# Patient Record
Sex: FEMALE | Race: WHITE | Employment: FULL TIME | URBAN - METROPOLITAN AREA
[De-identification: names, ages, dates, MRNs, and addresses within clinical notes are randomized per-mention and may not be internally consistent; named-entity substitution may affect disease eponyms.]

---

## 2018-11-14 ENCOUNTER — HOSPITAL ENCOUNTER (EMERGENCY)
Facility: CLINIC | Age: 48
Discharge: HOME | End: 2018-11-14
Attending: EMERGENCY MEDICINE | Admitting: EMERGENCY MEDICINE
Payer: COMMERCIAL

## 2018-11-14 VITALS
SYSTOLIC BLOOD PRESSURE: 133 MMHG | RESPIRATION RATE: 18 BRPM | TEMPERATURE: 97 F | WEIGHT: 172 LBS | OXYGEN SATURATION: 97 % | HEART RATE: 86 BPM | DIASTOLIC BLOOD PRESSURE: 95 MMHG

## 2018-11-14 DIAGNOSIS — G43.001 MIGRAINE WITHOUT AURA AND WITH STATUS MIGRAINOSUS, NOT INTRACTABLE: ICD-10-CM

## 2018-11-14 PROCEDURE — 96375 TX/PRO/DX INJ NEW DRUG ADDON: CPT

## 2018-11-14 PROCEDURE — 25000128 H RX IP 250 OP 636: Performed by: EMERGENCY MEDICINE

## 2018-11-14 PROCEDURE — 99284 EMERGENCY DEPT VISIT MOD MDM: CPT | Mod: 25

## 2018-11-14 PROCEDURE — 96374 THER/PROPH/DIAG INJ IV PUSH: CPT

## 2018-11-14 RX ORDER — DEXAMETHASONE SODIUM PHOSPHATE 10 MG/ML
10 INJECTION, SOLUTION INTRAMUSCULAR; INTRAVENOUS ONCE
Status: COMPLETED | OUTPATIENT
Start: 2018-11-14 | End: 2018-11-14

## 2018-11-14 RX ORDER — DIPHENHYDRAMINE HYDROCHLORIDE 50 MG/ML
25 INJECTION INTRAMUSCULAR; INTRAVENOUS ONCE
Status: COMPLETED | OUTPATIENT
Start: 2018-11-14 | End: 2018-11-14

## 2018-11-14 RX ADMIN — DIPHENHYDRAMINE HYDROCHLORIDE 25 MG: 50 INJECTION, SOLUTION INTRAMUSCULAR; INTRAVENOUS at 12:40

## 2018-11-14 RX ADMIN — PROCHLORPERAZINE EDISYLATE 10 MG: 5 INJECTION INTRAMUSCULAR; INTRAVENOUS at 12:40

## 2018-11-14 RX ADMIN — DEXAMETHASONE SODIUM PHOSPHATE 10 MG: 10 INJECTION, SOLUTION INTRAMUSCULAR; INTRAVENOUS at 12:40

## 2018-11-14 ASSESSMENT — ENCOUNTER SYMPTOMS
PHOTOPHOBIA: 0
NECK PAIN: 0
HEADACHES: 1
NECK STIFFNESS: 0
BACK PAIN: 0

## 2018-11-14 NOTE — ED TRIAGE NOTES
Headache that started on Friday. Has been taking ibuprofen with no relief.  No difficulty with vision or weakness.

## 2018-11-14 NOTE — ED AVS SNAPSHOT
[facility] Emergency Department    [address]    [facility] [address]    Phone:  [phone]    Fax:  [phone]                                       Shruthi Hart   MRN: [id number]    Department:  [facility] Emergency Department   Date of Visit:  11/14/2018           Patient Information     Date Of Birth          1970        Your diagnoses for this visit were:     Migraine without aura and with status migrainosus, not intractable        You were seen by Ector Gamble MD.      Follow-up Information     Follow up with Clinic, [facility]. Schedule an appointment as soon as possible for a visit in 1 day.    Why:  As needed, If symptoms worsen    Contact information:    [address] [facility] [id number] [phone]          Discharge Instructions         Migraine Headache  This often severe type of headache is different from other types of headaches in that symptoms other than pain occur with the headache. Nausea and vomiting, lightheadedness, sensitivity to light (photophobia), and other visual disturbances are common migraine symptoms. The pain may last from a few hours to several days. It is not clear why migraines occur but certain factors called  triggers  can raise the risk of having a migraine attack. A migraine may be triggered by emotional stress or depression, or by hormone changes during the menstrual cycle. Other triggers include birth control pills, overuse of migraine medicines, alcohol or caffeine, foods with tyramine (such as aged cheese and wine), eyestrain, weather changes, missed meals, or too little or too much sleep.  Home care  Follow these tips when taking care of yourself at home:    Don t drive yourself home if you were given pain medicine for your headache or are having visual symptoms. Instead, have someone else drive you home. Try to sleep when you get home. You should feel much better when you wake up.    Cold can  help ease migraine symptoms. Put an ice pack on your forehead or at the base of your skull. Put heat on the back of your neck to help ease any neck spasm.    Drink only clear liquids or eat a light diet until your symptoms get better. This will help you avoid nausea and vomiting.  How to prevent migraines  Pay attention to what seems to trigger your headache. Try to avoid the triggers when you can. If you have frequent headaches, consider keeping a headache diary. In it, write down what you were doing, feeling, or eating in the hours before each headache. Show this to your healthcare provider to help find the cause of your headaches.  If stress seems to be a trigger for your headaches, figure out what is causing stress in your life. Learn new ways to handle your stress. Ideas include regular exercise, biofeedback, self-hypnosis, yoga, and meditation. Talk with your healthcare provider to find out more information about managing stress. Many books and digital media are also available on this subject.  Tyramine is a substance found in many foods. It can trigger a migraine in some people. These foods contain tyramine:    Chocolate    Yogurt    All cheeses, but especially aged cheeses    Smoked or pickled fish and meat, including herring, caviar, bologna, pepperoni, and salami    Liver    Avocados    Bananas    Figs    Raisins    Red wine  Try staying away from these foods for 1 to 2 months to see if you have fewer headaches.  How to treat future headaches    Take time out at the first sign of a headache, if possible. Find a quiet, dark, comfortable place to sit or lie down. Let yourself relax or sleep.    Put an ice pack on your forehead or on the area of greatest pain. A heating pad and massage may help if you are having a muscle spasm and tightness in your neck.    If you have been prescribed a medicine to stop a migraine headache, use this at the first warning sign of the headache for best results. First signs may  be an aura or pain.    If you need to take medicine often for your migraine, talk with your healthcare provider about other ways to prevent your headaches.  Follow-up care  Follow up with your healthcare provider, or as advised. Talk with your provider if you have frequent headaches. He or she can figure out a treatment plan. Ask if you can have medicine to take at home the next time you get a bad headache. This may keep you from having to visit the emergency department in the future. You may need to see a headache specialist (neurologist) if you continue to have headaches.  When to seek medical advice  Call your healthcare provider right away if any of these occur:    Your head pain gets worse, or doesn t get better within 24 hours    You can t keep liquids down (repeated vomiting)    Pain in your sinuses, ears, or throat    Fever of 100.4  F (38  C) or higher, or as directed by your healthcare provider    Stiff neck    Extreme drowsiness, confusion, or fainting    Dizziness, or dizziness with spinning sensation (vertigo)    Weakness in an arm or leg, or on one side of your face    Difficulty talking or seeing  Date Last Reviewed: 8/1/2016 2000-2018 [employer]. [address] [place] [address]. All rights reserved. This information is not intended as a substitute for professional medical care. Always follow your healthcare professional's instructions.          24 Hour Appointment Hotline       To make an appointment at any [facility], call [phone] ([phone]). If you don't have a family doctor or clinic, we will help you find one. [facility] clinics are conveniently located to serve the needs of you and your family.             Review of your medicines      Notice     You have not been prescribed any medications.            Orders Needing Specimen Collection     None      Pending Results     No orders found from 11/12/2018 to 11/15/2018.            Pending Culture Results      No orders found from 11/12/2018 to 11/15/2018.            Pending Results Instructions     If you had any lab results that were not finalized at the time of your Discharge, you can call the ED Lab Result RN at [phone]. You will be contacted by this team for any positive Lab results or changes in treatment. The nurses are available 7 days a week from 10A to 6:30P.  You can leave a message 24 hours per day and they will return your call.        Test Results From Your Hospital Stay               Clinical Quality Measure: Blood Pressure Screening     Your blood pressure was checked while you were in the emergency department today. The last reading we obtained was  BP: 132/86 (Simultaneous filing. User may not have seen previous data.) . Please read the guidelines below about what these numbers mean and what you should do about them.  If your systolic blood pressure (the top number) is less than 120 and your diastolic blood pressure (the bottom number) is less than 80, then your blood pressure is normal. There is nothing more that you need to do about it.  If your systolic blood pressure (the top number) is 120-139 or your diastolic blood pressure (the bottom number) is 80-89, your blood pressure may be higher than it should be. You should have your blood pressure rechecked within a year by a primary care provider.  If your systolic blood pressure (the top number) is 140 or greater or your diastolic blood pressure (the bottom number) is 90 or greater, you may have high blood pressure. High blood pressure is treatable, but if left untreated over time it can put you at risk for heart attack, stroke, or kidney failure. You should have your blood pressure rechecked by a primary care provider within the next 4 weeks.  If your provider in the emergency department today gave you specific instructions to follow-up with your doctor or provider even sooner than that, you should follow that instruction and not wait for up  "to 4 weeks for your follow-up visit.        Thank you for choosing [facility]       Thank you for choosing [facility] for your care. Our goal is always to provide you with excellent care. Hearing back from our patients is one way we can continue to improve our services. Please take a few minutes to complete the written survey that you may receive in the mail after you visit with us. Thank you!        [employer] Information     [employer] lets you send messages to your doctor, view your test results, renew your prescriptions, schedule appointments and more. To sign up, go to [email or web address] . Click on \"Log in\" on the left side of the screen, which will take you to the Welcome page. Then click on \"Sign up Now\" on the right side of the page.     You will be asked to enter the access code listed below, as well as some personal information. Please follow the directions to create your username and password.     Your access code is: [email or web address]  Expires: 2019  1:56 PM     Your access code will  in 90 days. If you need help or a new code, please call your [facility] clinic or [phone].        Care EveryWhere ID     This is your Care EveryWhere ID. This could be used by other organizations to access your [facility] medical records  [id number]        Equal Access to Services     LISSY DOCKERY AH: Hetal smith Sodenise, waaxda luqadaha, qaybta kaalmada nicolasa, gissell zheng. So [facility] [phone].    ATENCIÓN: Si habla español, tiene a valderrama disposición servicios gratuitos de asistencia lingüística. Llame al [phone].    We comply with applicable federal civil rights laws and [place] laws. We do not discriminate on the basis of race, color, national origin, age, disability, sex, sexual orientation, or gender identity.            After Visit Summary       This is your record. Keep this with you and show to your community pharmacist(s) and doctor(s) at your next visit.  "

## 2018-11-14 NOTE — DISCHARGE INSTRUCTIONS
Migraine Headache  This often severe type of headache is different from other types of headaches in that symptoms other than pain occur with the headache. Nausea and vomiting, lightheadedness, sensitivity to light (photophobia), and other visual disturbances are common migraine symptoms. The pain may last from a few hours to several days. It is not clear why migraines occur but certain factors called  triggers  can raise the risk of having a migraine attack. A migraine may be triggered by emotional stress or depression, or by hormone changes during the menstrual cycle. Other triggers include birth control pills, overuse of migraine medicines, alcohol or caffeine, foods with tyramine (such as aged cheese and wine), eyestrain, weather changes, missed meals, or too little or too much sleep.  Home care  Follow these tips when taking care of yourself at home:    Don t drive yourself home if you were given pain medicine for your headache or are having visual symptoms. Instead, have someone else drive you home. Try to sleep when you get home. You should feel much better when you wake up.    Cold can help ease migraine symptoms. Put an ice pack on your forehead or at the base of your skull. Put heat on the back of your neck to help ease any neck spasm.    Drink only clear liquids or eat a light diet until your symptoms get better. This will help you avoid nausea and vomiting.  How to prevent migraines  Pay attention to what seems to trigger your headache. Try to avoid the triggers when you can. If you have frequent headaches, consider keeping a headache diary. In it, write down what you were doing, feeling, or eating in the hours before each headache. Show this to your healthcare provider to help find the cause of your headaches.  If stress seems to be a trigger for your headaches, figure out what is causing stress in your life. Learn new ways to handle your stress. Ideas include regular exercise, biofeedback,  self-hypnosis, yoga, and meditation. Talk with your healthcare provider to find out more information about managing stress. Many books and digital media are also available on this subject.  Tyramine is a substance found in many foods. It can trigger a migraine in some people. These foods contain tyramine:    Chocolate    Yogurt    All cheeses, but especially aged cheeses    Smoked or pickled fish and meat, including herring, caviar, bologna, pepperoni, and salami    Liver    Avocados    Bananas    Figs    Raisins    Red wine  Try staying away from these foods for 1 to 2 months to see if you have fewer headaches.  How to treat future headaches    Take time out at the first sign of a headache, if possible. Find a quiet, dark, comfortable place to sit or lie down. Let yourself relax or sleep.    Put an ice pack on your forehead or on the area of greatest pain. A heating pad and massage may help if you are having a muscle spasm and tightness in your neck.    If you have been prescribed a medicine to stop a migraine headache, use this at the first warning sign of the headache for best results. First signs may be an aura or pain.    If you need to take medicine often for your migraine, talk with your healthcare provider about other ways to prevent your headaches.  Follow-up care  Follow up with your healthcare provider, or as advised. Talk with your provider if you have frequent headaches. He or she can figure out a treatment plan. Ask if you can have medicine to take at home the next time you get a bad headache. This may keep you from having to visit the emergency department in the future. You may need to see a headache specialist (neurologist) if you continue to have headaches.  When to seek medical advice  Call your healthcare provider right away if any of these occur:    Your head pain gets worse, or doesn t get better within 24 hours    You can t keep liquids down (repeated vomiting)    Pain in your sinuses, ears, or  throat    Fever of 100.4  F (38  C) or higher, or as directed by your healthcare provider    Stiff neck    Extreme drowsiness, confusion, or fainting    Dizziness, or dizziness with spinning sensation (vertigo)    Weakness in an arm or leg, or on one side of your face    Difficulty talking or seeing  Date Last Reviewed: 8/1/2016 2000-2018 [employer]. [address] [place] [address]. All rights reserved. This information is not intended as a substitute for professional medical care. Always follow your healthcare professional's instructions.

## 2018-11-14 NOTE — ED PROVIDER NOTES
History     Chief Complaint:  Headache      HPI   Shruthi Hart is a 47 year old female with a history of migraines who presents for evaluation of a headache. Per the patient's report, she has been experiencing a headache for the past six days which began while she was at work and has gradually gotten worse. Over the course of these past six days the patient reports that she has taken a lot of ibuprofen without relief of her symptoms. Last night the patient took some left over Vicodin which helped her sleep through the night. This morning her headache was still present and begins at the top of her head and radiates to her neck although she denies neck stiffness or reduced range of motion. The patient also denies arm pain, leg pain, back pain, eye pain, or photophobia.   Of note, the patient reports that her last migraine was 10 years ago and at that time she attributes the headache to stress.         Allergies:  Midazolam  Morphine   Diazepam       Medications:    The patient is not currently taking any prescribed medications.    Past Medical History:    Ovarian Neoplasm      Past Surgical History:    Appendectomy  Cholecystectomy   EYE surgery  GYN surgery    Family History:    History reviewed. No pertinent family history.     Social History:  Smoking status: Current every day smoker 1.00  Alcohol use: No    Marital Status:   [2]       Review of Systems   HENT: Negative for ear pain.    Eyes: Negative for photophobia.   Musculoskeletal: Negative for back pain, neck pain and neck stiffness.   Neurological: Positive for headaches.   All other systems reviewed and are negative.        Physical Exam     Patient Vitals for the past 24 hrs:   BP Temp Temp src Pulse Resp SpO2 Weight   11/14/18 1356 (!) 133/95 - - 86 18 97 % -   11/14/18 1300 132/86 - - - - 100 % -   11/14/18 1146 (!) 190/125 - - - - - -   11/14/18 1136 - 97  F (36.1  C) Temporal 89 16 100 % 78 kg (172 lb)         Physical Exam  Constitutional:  Patient is well appearing. No distress.  Head: Atraumatic.  Mouth/Throat: Oropharynx is clear and moist. No oropharyngeal exudate.  Eyes: Conjunctivae and EOM are normal. No scleral icterus.  Neck: Normal range of motion. Neck supple.   Cardiovascular: Normal rate, regular rhythm, normal heart sounds and intact distal pulses.   Pulmonary/Chest: Breath sounds normal. No respiratory distress.  Abdominal: Soft. Bowel sounds are normal. No distension. No tenderness. No rebound or guarding.   Musculoskeletal: Normal range of motion. No edema or tenderness.   Neurological: Alert and orientated to person, place, and time. No observable focal neuro deficit  Skin: Warm and dry. No rash noted. Not diaphoretic.       Emergency Department Course     Interventions:  1240 Compazine 10 mg IV  1240 Benadryl 25 mg IV  1240 Decadron 10 mg IV      Emergency Department Course:  Past medical records, nursing notes, and vitals reviewed.  1211: I performed an exam of the patient and obtained history, as documented above..    1358: I rechecked the patient. Findings and plan explained to the Patient. Patient discharged home with instructions regarding supportive care, medications, and reasons to return. The importance of close follow-up was reviewed.     Impression & Plan      Medical Decision Making:  Shruthi Hart is a 47 year old female with a headache consistent with her normal migraine.  Evaluation in the emergency department has been negative. The patient has not had any fever, weakness, numbness, paresthesia, neck stiffness or confusion. They have a nl neuro exam. Meningitis, subarachnoid hemorrhage, CNS tumor, and stroke are considered as part of the differential, and considered unlikely. The pain has improved with medication interventions.  The patient should follow-up with his/her primary physician within 3 days. If the headache continues or the frequency increases, consultation with neurology will be indicated.  Return if  increasing pain, fever, vomiting or weakness.   All questions and concerns were answered. The patient was discharged home and recommended to follow up with her primary physician and return with any new or worsening symptoms.       Diagnosis:    ICD-10-CM    1. Migraine without aura and with status migrainosus, not intractable G43.001        Disposition:  discharged to home        Gentry Royal  11/14/2018   [facility] EMERGENCY DEPARTMENT    Scribe Disclosure:  I, Gentry Paige, am serving as a scribe at 12:11 PM on 11/14/2018 to document services personally performed by Ector Gamble MD based on my observations and the provider's statements to me.        Ector Gamble MD  11/14/18 [id number]

## 2018-11-14 NOTE — ED AVS SNAPSHOT
[facility] Emergency Department    [address]    [facility] [address]    Phone:  [phone]    Fax:  [phone]                                       Shruthi Hart   MRN: [id number]    Department:  [facility] Emergency Department   Date of Visit:  11/14/2018           After Visit Summary Signature Page     I have received my discharge instructions, and my questions have been answered. I have discussed any challenges I see with this plan with the nurse or doctor.    ..........................................................................................................................................  Patient/Patient Representative Signature      ..........................................................................................................................................  Patient Representative Print Name and Relationship to Patient    ..................................................               ................................................  Date                                   Time    ..........................................................................................................................................  Reviewed by Signature/Title    ...................................................              ..............................................  Date                                               Time          22EPIC Rev 08/18

## 2020-09-16 ENCOUNTER — THERAPY VISIT (OUTPATIENT)
Dept: OCCUPATIONAL THERAPY | Facility: CLINIC | Age: 50
End: 2020-09-16
Payer: COMMERCIAL

## 2020-09-16 DIAGNOSIS — G56.02 CARPAL TUNNEL SYNDROME OF LEFT WRIST: ICD-10-CM

## 2020-09-16 DIAGNOSIS — M79.642 PAIN OF LEFT HAND: ICD-10-CM

## 2020-09-16 PROCEDURE — 97760 ORTHOTIC MGMT&TRAING 1ST ENC: CPT | Mod: GO | Performed by: OCCUPATIONAL THERAPIST

## 2020-09-16 PROCEDURE — 97165 OT EVAL LOW COMPLEX 30 MIN: CPT | Mod: GO | Performed by: OCCUPATIONAL THERAPIST

## 2020-09-16 NOTE — PROGRESS NOTES
Hand Therapy Initial Evaluation  Current Date:  9/16/2020    Subjective:  Shruthi Hart is a 49 year old R hand dominant female.    Diagnosis: L CTS   DOI: 08/05/2020    Patient reports symptoms of pain, edema, numbness and tingling  of the L wrist which occurred due to sudden overnight onset. Since onset symptoms are unchanged. Special tests: EMG completed on 9/15/2020, waiting on results.  Previous treatment: None. General health as reported by patient is good to excellent.  Pertinent medical history includes: Anemia, Overweight, Rheumatoid Arthritis, Smoking, Pain at Night/Rest.  Medical allergies: adverse reaction to some pain medications.  Surgical history: Orthopedic: Plantar fasciitis.  Medication history: Vitamin D, Ibuprophen.    Occupational Profile Information:  Current occupation is:   Currently working in normal job without restrictions  Job Tasks: Computer Work, Lifting, Carrying, Prolonged Standing, Repetitive Tasks  Prior functional level:  no limitations  Barriers include: none  Mobility: No difficulty  Transportation: drives  Leisure activities/hobbies: None, Spending time with grandson    Upper Extremity Functional Index Score:  SCORE:   Column Totals: /80: 64   (A lower score indicates greater disability.)    Objective:  Pain Level (Scale 0-10):   9/16/2020   At Rest 0/10   With Use 7-8/10     Pain Description:  Date 9/16/2020   Location L volar wrist into the base of the palm, both sides of long and index fingers   Pain Quality Aching, Burning and Shooting   Frequency intermittent     Pain is worst daytime or nighttime   Exacerbated by Bending the wrist (wrist flexion), lifting heavy objects   Relieved by Cold, rest, Ibuprophen (800mg 3x/day)   Progression Unchanged     Posture  Forward Neck Posture and Rounded Forward Shoulders    Edema   No edema noted today, pt reports significant edema in the mornings.    Sensation  Decreased Median Nerve distribution per pt report, Constant and  While Sleeping    ROM  Pain Report: 0-10/10  Wrist 9/16/2020 9/16/2020   AROM (PROM) R L   Extension 74 70   Flexion 58 88   RD WNL WNL   UD WNL WNL     Special Tests  Pain Report:  0-10/10   9/16/2020   Median Nerve Compression at Pronator + immediately   Carpal Compression Test--Durkan Test (30 sec) + 5 seconds   Gonzalez Test for Lumbrical Incursion  (fist x 30 secs) + 15 seconds   Tinels at Carpal Tunnel + Increased tinglying, no shooting pain   Phalens + 20 seconds     Neural Tension Testing  MNT: Median Neurodynamic Test (based on SOPHIA Fox's ULNT)   9/16/2020   0-5 Scale 0/5   Position:   0/5: Arm across abdomen in coronal plane  1/5: Depress shoulder, ER to neutral Abd shoulder to 45 degrees  2/5: ER shoulder to end range, keep elbow at 90 degrees  3/5: Extend elbow to 0 degrees  4/5: Fully supinate forearm  5/5: Extend wrist, fingers and thumb  Notes:    (+) indicates beyond grade level but less than prison to next level    (-) indicates over prison to level    S1  onset/change of patient's symptoms    S2 definite stop point based on patient's discomfort level    Strength   (Measured in pounds)  Pain Report: 0-10/10   9/16/2020 9/16/2020   Trials R L   1  2  3 74 27   Average 74 27     Lat Pinch 9/16/2020 9/16/2020   Trials R L   1  2  3 17 7   Average 17 7     3 Pt Pinch 9/16/2020 9/16/2020   Trials R L   1  2  3 16 6   Average 16 6     Assessment:  Patient presents with symptoms consistent with diagnosis of left Carpal Tunnel Syndrome, with conservative intervention.     Patient's limitations or Problem List includes:  Pain, Decreased ROM/motion, Increased edema, Weakness, Sensory disturbance, Decreased , Decreased pinch and Decreased coordination of the left wrist which interferes with the patient's ability to perform Self Care Tasks (dressing, eating, bathing), Work Tasks, Sleep Patterns and Household Chores as compared to previous level of function.    Rehab Potential:  Good - Return to full  activity, some limitations    Patient will benefit from skilled Occupational Therapy to increase ROM,  strength, pinch strength, forearm strength, coordination and sensation and decrease pain and edema to return to previous activity level and resume normal daily tasks and to reach their rehab potential.    Barriers to Learning:  No barrier    Communication Issues:  Patient appears to be able to clearly communicate and understand verbal and written communication and follow directions correctly.    Chart Review: Brief Chart Review and Simple history review with patient    Identified Performance Deficits: bathing/showering, dressing, feeding, functional mobility, hygiene and grooming, home establishment and management, meal preparation and cleanup, shopping, sleep and work    Assessment of Occupational Performance:  5 or more Performance Deficits    Clinical Decision Making (Complexity): Low complexity    Treatment Explanation:  The following has been discussed with the patient:  RX ordered/plan of care  Anticipated outcomes  Possible risks and side effects    Plan:  Frequency:  1 X week, once daily  Duration:  for 6 weeks    Treatment Plan:  Therapeutic Exercise:    AROM, AAROM, PROM, Tendon Gliding, Blocking and Reverse Blocking  Neuromuscular re-ed:   Nerve Gliding, Coordination/Dexterity and Posture  Manual Techniques:   Myofascial release and Manual edema mobilization  Orthotic Fabrication:    Static Forearm based orthosis  Self Care:    Self Care Tasks, Ergonomic Considerations and Work Tasks    Discharge Plan:  Achieve all LTG.  Independent in home treatment program.  Reach maximal therapeutic benefit.    Home Exercise Program:  Tendon gliding  Blocking  Distal Median Nerve Glides  Wrist in neutral Orthosis at night    Next Visit:  Begin HEP  Adjust Orthosis PRN  MFR to flexors  Distal median nerve glides

## 2020-09-16 NOTE — LETTER
[facility]  [address]  [address]  [place] [place] [place] [address]  [phone]    2020    Re: Shruthi Hart   :   1970  MRN:  [id number]   REFERRING PHYSICIAN:   Jackie FAITH [facility]    Date of Initial Evaluation:  20  Visits:  Rxs Used: 1  Reason for Referral:     Pain of left hand  Carpal tunnel syndrome of left wrist    EVALUATION SUMMARY    Hand Therapy Initial Evaluation  Current Date:  2020    Subjective:  Shruthi Hart is a 49 year old R hand dominant female.    Diagnosis: L CTS   DOI: 2020    Patient reports symptoms of pain, edema, numbness and tingling  of the L wrist which occurred due to sudden overnight onset. Since onset symptoms are unchanged. Special tests: EMG completed on 9/15/2020, waiting on results.  Previous treatment: None. General health as reported by patient is good to excellent.  Pertinent medical history includes: Anemia, Overweight, Rheumatoid Arthritis, Smoking, Pain at Night/Rest.  Medical allergies: adverse reaction to some pain medications.  Surgical history: Orthopedic: Plantar fasciitis.  Medication history: Vitamin D, Ibuprophen.    Occupational Profile Information:  Current occupation is:   Currently working in normal job without restrictions  Job Tasks: Computer Work, Lifting, Carrying, Prolonged Standing, Repetitive Tasks  Prior functional level:  no limitations  Barriers include: none  Mobility: No difficulty  Transportation: drives  Leisure activities/hobbies: None, Spending time with [facility]    Upper Extremity Functional Index Score:  SCORE:   Column Totals: /80: 64   (A lower score indicates greater disability.)          Re: Shruthi Hart   :   1970    Objective:  Pain Level (Scale 0-10):   2020   At Rest 0/10   With Use 7-8/10     Pain Description:  Date 2020   Location L volar wrist into the base of the palm, both sides of long and index fingers   Pain Quality  Aching, Burning and Shooting   Frequency intermittent     Pain is worst daytime or nighttime   Exacerbated by Bending the wrist (wrist flexion), lifting heavy objects   Relieved by Cold, rest, Ibuprophen (800mg 3x/day)   Progression Unchanged     Posture  Forward Neck Posture and Rounded Forward Shoulders    Edema   No edema noted today, pt reports significant edema in the mornings.    Sensation  Decreased Median Nerve distribution per pt report, Constant and While Sleeping    ROM  Pain Report: 0-10/10  Wrist 2020   AROM (PROM) R L   Extension 74 70   Flexion 58 88   RD WNL WNL   UD WNL WNL     Special Tests  Pain Report:  0-10/10   2020   Median Nerve Compression at Pronator + immediately   Carpal Compression Test--Durkan Test (30 sec) + 5 seconds   Gonzalez Test for Lumbrical Incursion  (fist x 30 secs) + 15 seconds   Tinels at Carpal Tunnel + Increased tinglying, no shooting pain   Phalens + 20 seconds           Re: Shruthi Hart   :   1970    Neural Tension Testing  MNT: Median Neurodynamic Test (based on SOPHIA Fox's ULNT)   2020   0-5 Scale 0/5   Position:   0/5: Arm across abdomen in coronal plane  1/5: Depress shoulder, ER to neutral Abd shoulder to 45 degrees  2/5: ER shoulder to end range, keep elbow at 90 degrees  3/5: Extend elbow to 0 degrees  4/5: Fully supinate forearm  5/5: Extend wrist, fingers and thumb  Notes:    (+) indicates beyond grade level but less than [facility] to next level    (-) indicates over [facility] to level    S1  onset/change of patient's symptoms    S2 definite stop point based on patient's discomfort level    Strength   (Measured in pounds)  Pain Report: 0-10/10   2020   Trials R L   1  2  3 74 27   Average 74 27     Lat Pinch 2020   Trials R L   1  2  3 17 7   Average 17 7     3 Pt Pinch 2020   Trials R L   1  2  3 16 6   Average 16 6     Assessment:  Patient presents with symptoms consistent with  diagnosis of left Carpal Tunnel Syndrome, with conservative intervention.     Patient's limitations or Problem List includes:  Pain, Decreased ROM/motion, Increased edema, Weakness, Sensory disturbance, Decreased , Decreased pinch and Decreased coordination of the left wrist which interferes with the patient's ability to perform Self Care Tasks (dressing, eating, bathing), Work Tasks, Sleep Patterns and Household Chores as compared to previous level of function.    Rehab Potential:  Good - Return to full activity, some limitations          Re: Shruthi Recinossuki   :   1970    Patient will benefit from skilled Occupational Therapy to increase ROM,  strength, pinch strength, forearm strength, coordination and sensation and decrease pain and edema to return to previous activity level and resume normal daily tasks and to reach their rehab potential.    Barriers to Learning:  No barrier    Communication Issues:  Patient appears to be able to clearly communicate and understand verbal and written communication and follow directions correctly.    Chart Review: Brief Chart Review and Simple history review with patient    Identified Performance Deficits: bathing/showering, dressing, feeding, functional mobility, hygiene and grooming, home establishment and management, meal preparation and cleanup, shopping, sleep and work    Assessment of Occupational Performance:  5 or more Performance Deficits    Clinical Decision Making (Complexity): Low complexity    Treatment Explanation:  The following has been discussed with the patient:  RX ordered/plan of care  Anticipated outcomes  Possible risks and side effects    Plan:  Frequency:  1 X week, once daily  Duration:  for 6 weeks    Treatment Plan:  Therapeutic Exercise:    AROM, AAROM, PROM, Tendon Gliding, Blocking and Reverse Blocking  Neuromuscular re-ed:   Nerve Gliding, Coordination/Dexterity and Posture  Manual Techniques:   Myofascial release and Manual edema  mobilization  Orthotic Fabrication:    Static Forearm based orthosis  Self Care:    Self Care Tasks, Ergonomic Considerations and Work Tasks    Discharge Plan:  Achieve all LTG.  Independent in home treatment program.  Reach maximal therapeutic benefit.    Home Exercise Program:  Tendon gliding  Blocking  Distal Median Nerve Glides  Wrist in neutral Orthosis at night    Next Visit:  Begin HEP  Adjust Orthosis PRN  MFR to flexors  Distal median nerve glides      Re: Shruthi Hart   :   1970      Thank you for your referral.    INQUIRIES  Therapist: KAROL Abad,CHT  [address]  [address] [place] [id number]  Phone: [phone]  Fax: [phone]

## 2020-09-17 PROBLEM — G56.02 CARPAL TUNNEL SYNDROME OF LEFT WRIST: Status: ACTIVE | Noted: 2020-09-17

## 2020-09-17 PROBLEM — M79.642 PAIN OF LEFT HAND: Status: ACTIVE | Noted: 2020-09-17

## 2020-09-23 ENCOUNTER — THERAPY VISIT (OUTPATIENT)
Dept: OCCUPATIONAL THERAPY | Facility: CLINIC | Age: 50
End: 2020-09-23
Payer: COMMERCIAL

## 2020-09-23 DIAGNOSIS — M79.642 PAIN OF LEFT HAND: ICD-10-CM

## 2020-09-23 DIAGNOSIS — G56.02 CARPAL TUNNEL SYNDROME OF LEFT WRIST: ICD-10-CM

## 2020-09-23 PROCEDURE — 97110 THERAPEUTIC EXERCISES: CPT | Mod: GO | Performed by: OCCUPATIONAL THERAPIST

## 2020-09-23 PROCEDURE — 97140 MANUAL THERAPY 1/> REGIONS: CPT | Mod: GO | Performed by: OCCUPATIONAL THERAPIST

## 2020-09-23 PROCEDURE — 97112 NEUROMUSCULAR REEDUCATION: CPT | Mod: GO | Performed by: OCCUPATIONAL THERAPIST

## 2020-09-30 ENCOUNTER — THERAPY VISIT (OUTPATIENT)
Dept: OCCUPATIONAL THERAPY | Facility: CLINIC | Age: 50
End: 2020-09-30
Payer: COMMERCIAL

## 2020-09-30 DIAGNOSIS — M79.642 PAIN OF LEFT HAND: ICD-10-CM

## 2020-09-30 DIAGNOSIS — G56.02 CARPAL TUNNEL SYNDROME OF LEFT WRIST: ICD-10-CM

## 2020-09-30 PROCEDURE — 97140 MANUAL THERAPY 1/> REGIONS: CPT | Mod: GO | Performed by: OCCUPATIONAL THERAPIST

## 2020-09-30 PROCEDURE — 97110 THERAPEUTIC EXERCISES: CPT | Mod: GO | Performed by: OCCUPATIONAL THERAPIST

## 2020-09-30 PROCEDURE — 97112 NEUROMUSCULAR REEDUCATION: CPT | Mod: GO | Performed by: OCCUPATIONAL THERAPIST

## 2020-11-05 PROBLEM — G56.02 CARPAL TUNNEL SYNDROME OF LEFT WRIST: Status: RESOLVED | Noted: 2020-09-17 | Resolved: 2020-11-05

## 2020-11-05 PROBLEM — M79.642 PAIN OF LEFT HAND: Status: RESOLVED | Noted: 2020-09-17 | Resolved: 2020-11-05
